# Patient Record
Sex: MALE | Race: OTHER | URBAN - METROPOLITAN AREA
[De-identification: names, ages, dates, MRNs, and addresses within clinical notes are randomized per-mention and may not be internally consistent; named-entity substitution may affect disease eponyms.]

---

## 2019-02-07 ENCOUNTER — IMPORTED ENCOUNTER (OUTPATIENT)
Dept: URBAN - METROPOLITAN AREA CLINIC 9 | Facility: CLINIC | Age: 64
End: 2019-02-07

## 2019-02-21 ENCOUNTER — IMPORTED ENCOUNTER (OUTPATIENT)
Dept: URBAN - METROPOLITAN AREA CLINIC 9 | Facility: CLINIC | Age: 64
End: 2019-02-21

## 2019-04-17 ENCOUNTER — IMPORTED ENCOUNTER (OUTPATIENT)
Dept: URBAN - METROPOLITAN AREA CLINIC 9 | Facility: CLINIC | Age: 64
End: 2019-04-17

## 2019-04-24 ENCOUNTER — IMPORTED ENCOUNTER (OUTPATIENT)
Dept: URBAN - METROPOLITAN AREA CLINIC 9 | Facility: CLINIC | Age: 64
End: 2019-04-24

## 2019-05-13 ENCOUNTER — IMPORTED ENCOUNTER (OUTPATIENT)
Dept: URBAN - METROPOLITAN AREA CLINIC 9 | Facility: CLINIC | Age: 64
End: 2019-05-13

## 2020-06-08 NOTE — PATIENT DISCUSSION
The IOP is above the target range. Pt not compliant with drops will repeat SLT laser OU today. Pt agrees to proceed. If future changes TC surgical options.

## 2020-06-08 NOTE — PROCEDURE NOTE: CLINICAL
PROCEDURE NOTE: SLT OU. Anesthesia: Topical. Prep: Alphagan 0.15%. Prior to treatment, risks/benefits/alternatives discussed including infection, loss of vision, hemorrhage, cataract, glaucoma, retinal tears or detachment. Lens:  SLT laser lens with goniosol. Power: 1.0/1.0mJ. Total applications: 510/45. Application 08/72 degrees. Patient tolerated procedure well. There were no complications. Post-op instructions given. Post-op IOP = * mmHg. Elaina Knight

## 2020-07-15 NOTE — PATIENT DISCUSSION
GOOD RESPONSE TO SLT.  PT STATES HE'S BEEN MORE COMPLIANT WITH GTTS.  PT IS TO CONTINUE ALL GLC GTTS.    CONTINUE TO FOLLOW W/ JRL EVERY 3-4 MONTHS W/ HVF, OCT AND OPTIC NERVE EVAL.    IF CHANGES, SEND BACK TO [facility].

## 2020-08-31 ENCOUNTER — IMPORTED ENCOUNTER (OUTPATIENT)
Dept: URBAN - METROPOLITAN AREA CLINIC 9 | Facility: CLINIC | Age: 65
End: 2020-08-31

## 2020-08-31 PROBLEM — H53.032: Noted: 2020-08-31

## 2020-08-31 PROBLEM — H26.492: Noted: 2020-08-31

## 2020-08-31 PROBLEM — H40.013: Noted: 2020-08-31

## 2020-08-31 PROBLEM — H04.123: Noted: 2020-08-31

## 2020-08-31 PROBLEM — H50.112: Noted: 2020-08-31

## 2021-09-02 NOTE — PATIENT DISCUSSION
9/2/21: +/- Edvin stent. Patient recently changed to rocklatan from latanoprost 2nd to worsening of VF OS.

## 2021-09-29 NOTE — PATIENT DISCUSSION
Pt ed, most likely will need surgery in OS. Pt tolerating meds at this time and prefers to hold off on surgery for now. Will monitor closely. Pt understands if further changes will discuss Trab sx option.

## 2021-10-18 ASSESSMENT — KERATOMETRY
OS_K1POWER_DIOPTERS: 42.75
OS_K1POWER_DIOPTERS: 43.5
OD_AXISANGLE_DEGREES: 35
OS_AXISANGLE_DEGREES: 94
OD_K2POWER_DIOPTERS: 43.75
OS_K2POWER_DIOPTERS: 44
OD_K1POWER_DIOPTERS: 42.75
OD_AXISANGLE_DEGREES: 100
OS_K2POWER_DIOPTERS: 43.75
OS_K2POWER_DIOPTERS: 44.25
OD_AXISANGLE_DEGREES: 93
OD_K2POWER_DIOPTERS: 43.5
OS_K1POWER_DIOPTERS: 43.25
OS_K1POWER_DIOPTERS: 43.5
OS_AXISANGLE2_DEGREES: 162
OD_K2POWER_DIOPTERS: 43.75
OD_K1POWER_DIOPTERS: 43
OD_AXISANGLE_DEGREES: 72
OD_K1POWER_DIOPTERS: 42.5
OS_AXISANGLE2_DEGREES: 4
OS_AXISANGLE_DEGREES: 1
OD_AXISANGLE2_DEGREES: 125
OS_K1POWER_DIOPTERS: 42.75
OD_AXISANGLE2_DEGREES: 10
OS_K2POWER_DIOPTERS: 45
OD_AXISANGLE2_DEGREES: 3
OD_K1POWER_DIOPTERS: 43.5
OD_K2POWER_DIOPTERS: 43.75
OS_AXISANGLE2_DEGREES: 91
OS_K2POWER_DIOPTERS: 43.75
OD_AXISANGLE2_DEGREES: 162
OS_AXISANGLE2_DEGREES: 115
OS_AXISANGLE_DEGREES: 25
OS_AXISANGLE2_DEGREES: 104
OS_AXISANGLE_DEGREES: 14
OS_AXISANGLE_DEGREES: 72

## 2021-10-18 ASSESSMENT — TONOMETRY
OD_IOP_MMHG: 18
OS_IOP_MMHG: 15
OD_IOP_MMHG: 14
OD_IOP_MMHG: 15
OS_IOP_MMHG: 16
OS_IOP_MMHG: 17
OD_IOP_MMHG: 18

## 2021-10-18 ASSESSMENT — VISUAL ACUITY
OS_SC: 20/70 - SN
OD_CC: 20/20 -2 SN
OD_CC: 20/30 +2 SN
OS_SC: 20/60 +2 SN
OD_SC: 20/20 - SN
OS_SC: 20/50 - SN
OD_CC: 20/25 - SN
OS_CC: 20/40 SN
OS_CC: 20/40 - SN
OD_SC: 20/25 - SN
OD_CC: 20/25 +2 SN
OD_SC: CF 8' LV
OD_SC: 20/40 - SN
OS_CC: 20/60 SN
OD_CC: 20/20 - SN
OS_CC: 20/60 +2 SN
OS_SC: CF 4' LV
OS_CC: 20/20 - SN

## 2022-05-12 ENCOUNTER — ESTABLISHED PATIENT (OUTPATIENT)
Dept: URBAN - METROPOLITAN AREA CLINIC 9 | Facility: CLINIC | Age: 67
End: 2022-05-12

## 2022-05-12 DIAGNOSIS — H43.393: ICD-10-CM

## 2022-05-12 DIAGNOSIS — H26.492: ICD-10-CM

## 2022-05-12 DIAGNOSIS — H40.013: ICD-10-CM

## 2022-05-12 DIAGNOSIS — H53.032: ICD-10-CM

## 2022-05-12 PROCEDURE — 92015 DETERMINE REFRACTIVE STATE: CPT

## 2022-05-12 PROCEDURE — 92014 COMPRE OPH EXAM EST PT 1/>: CPT

## 2022-05-12 PROCEDURE — 92133 CPTRZD OPH DX IMG PST SGM ON: CPT

## 2022-05-12 ASSESSMENT — VISUAL ACUITY
OD_CC: 20/20
OS_CC: J1
OU_CC: 20/20-1
OS_SC: 20/40-2
OU_CC: J1+
OS_CC: 20/30-2
OD_SC: 20/25-2
OU_SC: 20/25+1
OD_CC: J1+

## 2022-05-12 ASSESSMENT — KERATOMETRY
OS_AXISANGLE2_DEGREES: 90
OD_K1POWER_DIOPTERS: 42.25
OS_K1POWER_DIOPTERS: 43.50
OS_K2POWER_DIOPTERS: 43.50
OD_AXISANGLE_DEGREES: 089
OD_K2POWER_DIOPTERS: 43.75
OD_AXISANGLE2_DEGREES: 179
OS_AXISANGLE_DEGREES: 180

## 2022-05-12 ASSESSMENT — TONOMETRY
OS_IOP_MMHG: 16
OD_IOP_MMHG: 13

## 2022-07-05 RX ORDER — ATORVASTATIN CALCIUM 20 MG/1
TABLET, FILM COATED ORAL
COMMUNITY
Start: 2021-08-31 | End: 2022-10-12 | Stop reason: SDUPTHER

## 2022-07-05 RX ORDER — ACETAMINOPHEN/DIPHENHYDRAMINE 500MG-25MG
TABLET ORAL
COMMUNITY

## 2022-09-13 NOTE — PATIENT DISCUSSION
Chief Complaint   Patient presents with     Musculoskeletal Problem       Initial /72 (BP Location: Right arm, Patient Position: Sitting, Cuff Size: Adult Regular)   Pulse 77   Temp 98.5  F (36.9  C) (Tympanic)   Resp 20   Wt 98.9 kg (218 lb)   SpO2 95%   BMI 29.57 kg/m   Estimated body mass index is 29.57 kg/m  as calculated from the following:    Height as of 1/20/21: 1.829 m (6').    Weight as of this encounter: 98.9 kg (218 lb).  Medication Reconciliation: complete  Raheem Nguyen LPN   Wait 10 minutes in between glaucoma medications and ATs.

## 2022-09-14 NOTE — PATIENT DISCUSSION
Pt doing well. BCL instilled. Reviewed drops instructions and physical limitations. Pt to wear shield while sleeping.

## 2022-09-21 NOTE — PATIENT DISCUSSION
Pt doing well. BCL placed. Reviewed drops instructions (Dorz/ Samir bid OD until 1 day before sx) Moxi tid , Pred qid. and physical limitations. Pt to wear shield while sleeping. contin. current management w/glc gtts OS.

## 2022-10-05 NOTE — PATIENT DISCUSSION
GOOD POSTOPERATIVE APPEARANCE.  HOLD DORZ-TIMOLOL OD ONLY.  CONTINUE DUREZOL QID AND MOXI TID OD.  BCL REPLACED.  CONTINUE GLC GTTS OS ONLY.

## 2022-10-12 PROBLEM — E78.5 HYPERLIPIDEMIA: Status: ACTIVE | Noted: 2022-10-12

## 2022-10-12 PROBLEM — R10.32 LEFT INGUINAL PAIN: Status: ACTIVE | Noted: 2022-10-12

## 2022-10-12 PROBLEM — R10.9 ABDOMINAL PAIN: Status: ACTIVE | Noted: 2022-10-12

## 2022-10-12 PROBLEM — M70.22 OLECRANON BURSITIS OF LEFT ELBOW: Status: ACTIVE | Noted: 2022-10-12

## 2022-10-12 PROBLEM — R53.82 CHRONIC FATIGUE: Status: ACTIVE | Noted: 2022-10-12

## 2022-10-12 PROBLEM — K40.90 RIGHT INGUINAL HERNIA: Status: ACTIVE | Noted: 2022-10-12

## 2022-10-12 PROBLEM — E55.9 VITAMIN D DEFICIENCY: Status: ACTIVE | Noted: 2022-10-12

## 2022-10-12 PROBLEM — K40.90 LEFT INGUINAL HERNIA: Status: ACTIVE | Noted: 2022-10-12

## 2022-10-12 PROBLEM — I10 PRIMARY HYPERTENSION: Status: ACTIVE | Noted: 2019-01-24

## 2022-10-12 PROBLEM — R10.31 RIGHT INGUINAL PAIN: Status: ACTIVE | Noted: 2022-10-12

## 2022-10-12 PROBLEM — J30.2 SEASONAL ALLERGIES: Status: ACTIVE | Noted: 2022-10-12

## 2022-10-12 PROBLEM — G47.33 OSA (OBSTRUCTIVE SLEEP APNEA): Status: ACTIVE | Noted: 2022-10-12

## 2022-10-12 PROBLEM — I10 HYPERTENSIVE DISORDER: Status: ACTIVE | Noted: 2022-10-12

## 2022-10-12 PROBLEM — M25.551 RIGHT HIP PAIN: Status: ACTIVE | Noted: 2019-01-24

## 2022-10-24 NOTE — PATIENT DISCUSSION
GOOD POSTOPERATIVE APPEARANCE.  SURFACE SUTURES REMOVED IN OFC TODAY.  NO BCL REPLACED.  CONTINUE MOXI THROUGH TOMORROW THEN STOP.  CONTINUE DUREZOL QID FOR 2 WEEKS THEN TID UNTIL NEXT VISIT.  NO EYE RUBBING.

## 2022-11-30 NOTE — PATIENT DISCUSSION
GOOD POSTOPERATIVE APPEARANCE.  CONTINUE DUREZOL TID FOR 2 WEEKS THEN BID UNTIL NEXT VISIT.  NO EYE RUBBING.

## 2023-05-15 ENCOUNTER — ESTABLISHED PATIENT (OUTPATIENT)
Dept: URBAN - METROPOLITAN AREA CLINIC 9 | Facility: CLINIC | Age: 68
End: 2023-05-15

## 2023-05-15 DIAGNOSIS — H26.492: ICD-10-CM

## 2023-05-15 DIAGNOSIS — H40.013: ICD-10-CM

## 2023-05-15 DIAGNOSIS — H04.123: ICD-10-CM

## 2023-05-15 PROCEDURE — 92014 COMPRE OPH EXAM EST PT 1/>: CPT

## 2023-05-15 PROCEDURE — 92015 DETERMINE REFRACTIVE STATE: CPT

## 2023-05-15 PROCEDURE — 92133 CPTRZD OPH DX IMG PST SGM ON: CPT

## 2023-05-15 ASSESSMENT — KERATOMETRY
OS_K2POWER_DIOPTERS: 43.50
OD_AXISANGLE_DEGREES: 089
OD_K1POWER_DIOPTERS: 42.25
OD_AXISANGLE2_DEGREES: 179
OD_K2POWER_DIOPTERS: 43.75
OS_AXISANGLE_DEGREES: 180
OS_K1POWER_DIOPTERS: 43.50
OS_AXISANGLE2_DEGREES: 90

## 2023-05-15 ASSESSMENT — TONOMETRY
OS_IOP_MMHG: 17
OD_IOP_MMHG: 17

## 2023-05-15 ASSESSMENT — VISUAL ACUITY
OD_CC: 20/20
OU_CC: 20/20
OS_SC: 20/50
OD_SC: 20/25
OS_CC: 20/40
OU_SC: 20/25

## 2024-12-16 NOTE — PATIENT DISCUSSION
Patient achieved a 15% reduction in IOP from pretreatment levels or a plan is in place to achieve this goal. Patient is nonambulatory, wheelchair-bound

## 2025-01-06 ENCOUNTER — COMPREHENSIVE EXAM (OUTPATIENT)
Age: 70
End: 2025-01-06

## 2025-01-06 DIAGNOSIS — H40.013: ICD-10-CM

## 2025-01-06 DIAGNOSIS — H04.123: ICD-10-CM

## 2025-01-06 DIAGNOSIS — H21.552: ICD-10-CM

## 2025-01-06 DIAGNOSIS — H52.223: ICD-10-CM

## 2025-01-06 DIAGNOSIS — H26.492: ICD-10-CM

## 2025-01-06 PROCEDURE — 92133 CPTRZD OPH DX IMG PST SGM ON: CPT

## 2025-01-06 PROCEDURE — 92014 COMPRE OPH EXAM EST PT 1/>: CPT

## 2025-01-06 PROCEDURE — 92015 DETERMINE REFRACTIVE STATE: CPT
